# Patient Record
(demographics unavailable — no encounter records)

---

## 2024-10-08 NOTE — SOCIAL HISTORY
[With Family] : lives with family [Unemployed] : unemployed [Never ] : never  [College] : College [None] : none [FreeTextEntry1] : Pt was born and raised in [place]. Pt states her childhood was very happy. Her grandmother lived with her so she was very happy. Pt went to [employer] for elementary. She remembers those years being "the best".  Pt went to [employer] for  middle school. She remember people didn't like her much because she had really good grades. She was absent a lot in middle due to her heavy periods. She went to [employer] for high school, she did really well, pt had friends. Then pt went to [employer] for college. Pt will be graduating school this week. \par  \par  Pt is looking into software development.\par  \par  Pt denies any drug use, alcohol, nicotine. Pt is not pregnant.

## 2024-10-08 NOTE — PLAN
[FreeTextEntry5] : -counseling and support provided -dafne 100mg BID -continue strattera 80mg po daily for adhd symptoms. -er/911 prn -f/u 3 months

## 2024-10-08 NOTE — PAST MEDICAL HISTORY
[FreeTextEntry1] : Pt has no history \par  \par  Zoloft, lexapro - night sweats, vivid dreams\par  \par  Cymbalta -nausea\par  \par  Buspar

## 2024-10-08 NOTE — FAMILY HISTORY
[FreeTextEntry1] : Mother and father - OCD\par  Mom was in therapy in the past\par  Brother - ADHD and OCD \par  \par  \par  No family history of suicide.